# Patient Record
Sex: MALE | Race: WHITE | NOT HISPANIC OR LATINO | Employment: FULL TIME | URBAN - METROPOLITAN AREA
[De-identification: names, ages, dates, MRNs, and addresses within clinical notes are randomized per-mention and may not be internally consistent; named-entity substitution may affect disease eponyms.]

---

## 2019-07-23 ENCOUNTER — LAB REQUISITION (OUTPATIENT)
Dept: LAB | Facility: HOSPITAL | Age: 49
End: 2019-07-23

## 2019-07-23 ENCOUNTER — OFFICE VISIT (OUTPATIENT)
Dept: FAMILY MEDICINE CLINIC | Facility: CLINIC | Age: 49
End: 2019-07-23

## 2019-07-23 VITALS
BODY MASS INDEX: 27.86 KG/M2 | RESPIRATION RATE: 16 BRPM | HEART RATE: 71 BPM | SYSTOLIC BLOOD PRESSURE: 112 MMHG | DIASTOLIC BLOOD PRESSURE: 74 MMHG | HEIGHT: 71 IN | OXYGEN SATURATION: 99 % | TEMPERATURE: 96.9 F | WEIGHT: 199 LBS

## 2019-07-23 DIAGNOSIS — Z00.00 ROUTINE GENERAL MEDICAL EXAMINATION AT A HEALTH CARE FACILITY: ICD-10-CM

## 2019-07-23 DIAGNOSIS — Z12.5 PROSTATE CANCER SCREENING: ICD-10-CM

## 2019-07-23 DIAGNOSIS — Z00.00 ROUTINE GENERAL MEDICAL EXAMINATION AT A HEALTH CARE FACILITY: Primary | ICD-10-CM

## 2019-07-23 DIAGNOSIS — Z12.11 COLON CANCER SCREENING: ICD-10-CM

## 2019-07-23 PROCEDURE — 99386 PREV VISIT NEW AGE 40-64: CPT | Performed by: FAMILY MEDICINE

## 2019-07-23 PROCEDURE — 36415 COLL VENOUS BLD VENIPUNCTURE: CPT | Performed by: FAMILY MEDICINE

## 2019-07-23 RX ORDER — FAMOTIDINE 20 MG
TABLET ORAL
COMMUNITY

## 2019-07-23 RX ORDER — CETIRIZINE HYDROCHLORIDE 10 MG/1
10 TABLET ORAL DAILY
COMMUNITY

## 2019-07-23 RX ORDER — MULTIPLE VITAMINS W/ MINERALS TAB 9MG-400MCG
1 TAB ORAL DAILY
COMMUNITY

## 2019-07-23 RX ORDER — ASPIRIN 81 MG/1
81 TABLET, CHEWABLE ORAL DAILY
COMMUNITY

## 2019-07-23 RX ORDER — UBIDECARENONE 100 MG
100 CAPSULE ORAL DAILY
COMMUNITY

## 2019-07-23 NOTE — PROGRESS NOTES
Subjective   Sterling Campbell is a 49 y.o. male    Chief Complaint    Establish primary care  General physical exam  Prostate cancer screening    History of Present Illness  Patient presents today as a new patient.  He has requested a general physical exam with laboratory testing.  He has a history of previous elevated cholesterol levels but has been eating much more healthy since that was first discovered.  He has not had any blood levels rechecked as far as his cholesterol is concerned.  Health maintenance issues are reviewed.  Patient agreeable to have a colonoscopy scheduled.  He has never had colonoscopy.  No acute complaints.    The following portions of the patient's history were reviewed and updated as appropriate: allergies, current medications, past social history and problem list    Review of Systems   Constitutional: Negative.  Negative for chills, fatigue and fever.   HENT: Negative.    Eyes: Negative.    Respiratory: Negative.  Negative for cough, chest tightness, shortness of breath and wheezing.    Cardiovascular: Negative.  Negative for chest pain, palpitations and leg swelling.   Gastrointestinal: Negative.  Negative for abdominal pain, nausea and vomiting.   Endocrine: Negative.  Negative for polydipsia, polyphagia and polyuria.   Genitourinary: Negative.  Negative for dysuria, frequency and urgency.   Musculoskeletal: Negative.  Negative for arthralgias, back pain and myalgias.   Skin: Negative.  Negative for color change and rash.   Allergic/Immunologic: Negative.    Neurological: Negative.  Negative for weakness and headaches.   Hematological: Negative.  Negative for adenopathy. Does not bruise/bleed easily.   Psychiatric/Behavioral: Negative.    All other systems reviewed and are negative.      Objective     Vitals:    07/23/19 0810   BP: 112/74   Pulse: 71   Resp: 16   Temp: 96.9 °F (36.1 °C)   SpO2: 99%       Physical Exam   Constitutional: He is oriented to person, place, and time. He appears  well-developed and well-nourished.   HENT:   Head: Normocephalic and atraumatic.   Right Ear: External ear normal.   Left Ear: External ear normal.   Nose: Nose normal.   Mouth/Throat: Oropharynx is clear and moist.   Eyes: Conjunctivae and EOM are normal. Pupils are equal, round, and reactive to light.   Neck: Normal range of motion. Neck supple. No thyromegaly present.   Cardiovascular: Normal rate, regular rhythm, normal heart sounds and intact distal pulses.   No murmur heard.  Pulmonary/Chest: Effort normal and breath sounds normal.   Abdominal: Soft. Bowel sounds are normal. He exhibits no mass. There is no tenderness.   Genitourinary: Rectum normal, prostate normal and penis normal.   Musculoskeletal: Normal range of motion. He exhibits no edema.   Lymphadenopathy:     He has no cervical adenopathy.   Neurological: He is alert and oriented to person, place, and time. He has normal reflexes. No cranial nerve deficit.   Skin: Skin is warm and dry. No rash noted.   Psychiatric: He has a normal mood and affect. His behavior is normal.   Nursing note and vitals reviewed.      Assessment/Plan   Problem List Items Addressed This Visit     None      Visit Diagnoses     Routine general medical examination at a health care facility    -  Primary    Relevant Orders    CBC (No Diff)    Comprehensive Metabolic Panel    Lipid Panel    TSH    T4, Free    Prostate cancer screening        Relevant Orders    PSA Screen    Colon cancer screening        Relevant Orders    Ambulatory Referral For Screening Colonoscopy        Patient is counseled during today's visit regarding preventative health measures to include use of seatbelts, medication safety, healthy diet and regular exercise.

## 2019-07-24 LAB
ALBUMIN SERPL-MCNC: 5 G/DL (ref 3.5–5.2)
ALBUMIN/GLOB SERPL: 2.3 G/DL
ALP SERPL-CCNC: 58 U/L (ref 39–117)
ALT SERPL-CCNC: 33 U/L (ref 1–41)
AST SERPL-CCNC: 21 U/L (ref 1–40)
BILIRUB SERPL-MCNC: 0.6 MG/DL (ref 0.2–1.2)
BUN SERPL-MCNC: 13 MG/DL (ref 6–20)
BUN/CREAT SERPL: 13.5 (ref 7–25)
CALCIUM SERPL-MCNC: 9.1 MG/DL (ref 8.6–10.5)
CHLORIDE SERPL-SCNC: 103 MMOL/L (ref 98–107)
CHOLEST SERPL-MCNC: 275 MG/DL (ref 0–200)
CO2 SERPL-SCNC: 27.1 MMOL/L (ref 22–29)
CREAT SERPL-MCNC: 0.96 MG/DL (ref 0.76–1.27)
ERYTHROCYTE [DISTWIDTH] IN BLOOD BY AUTOMATED COUNT: 12.5 % (ref 12.3–15.4)
GLOBULIN SER CALC-MCNC: 2.2 GM/DL
GLUCOSE SERPL-MCNC: 112 MG/DL (ref 65–99)
HCT VFR BLD AUTO: 45.5 % (ref 37.5–51)
HDLC SERPL-MCNC: 49 MG/DL (ref 40–60)
HGB BLD-MCNC: 15 G/DL (ref 13–17.7)
LDLC SERPL CALC-MCNC: 161 MG/DL (ref 0–100)
MCH RBC QN AUTO: 30.9 PG (ref 26.6–33)
MCHC RBC AUTO-ENTMCNC: 33 G/DL (ref 31.5–35.7)
MCV RBC AUTO: 93.6 FL (ref 79–97)
PLATELET # BLD AUTO: 235 10*3/MM3 (ref 140–450)
POTASSIUM SERPL-SCNC: 4.6 MMOL/L (ref 3.5–5.2)
PROT SERPL-MCNC: 7.2 G/DL (ref 6–8.5)
PSA SERPL-MCNC: 0.47 NG/ML (ref 0–4)
RBC # BLD AUTO: 4.86 10*6/MM3 (ref 4.14–5.8)
SODIUM SERPL-SCNC: 142 MMOL/L (ref 136–145)
T4 FREE SERPL-MCNC: 1.48 NG/DL (ref 0.93–1.7)
TRIGL SERPL-MCNC: 323 MG/DL (ref 0–150)
TSH SERPL DL<=0.005 MIU/L-ACNC: 1.99 UIU/ML (ref 0.45–4.5)
VLDLC SERPL CALC-MCNC: 64.6 MG/DL
WBC # BLD AUTO: 3.72 10*3/MM3 (ref 3.4–10.8)

## 2019-07-24 RX ORDER — ROSUVASTATIN CALCIUM 20 MG/1
20 TABLET, COATED ORAL DAILY
Qty: 30 TABLET | Refills: 5 | Status: SHIPPED | OUTPATIENT
Start: 2019-07-24 | End: 2020-02-04 | Stop reason: SDUPTHER

## 2019-09-09 RX ORDER — SODIUM, POTASSIUM,MAG SULFATES 17.5-3.13G
2 SOLUTION, RECONSTITUTED, ORAL ORAL TAKE AS DIRECTED
Qty: 354 ML | Refills: 0 | Status: SHIPPED | OUTPATIENT
Start: 2019-09-09 | End: 2021-08-27

## 2019-09-13 ENCOUNTER — OUTSIDE FACILITY SERVICE (OUTPATIENT)
Dept: GASTROENTEROLOGY | Facility: CLINIC | Age: 49
End: 2019-09-13

## 2019-09-13 PROCEDURE — G0121 COLON CA SCRN NOT HI RSK IND: HCPCS | Performed by: INTERNAL MEDICINE

## 2020-02-04 ENCOUNTER — TELEPHONE (OUTPATIENT)
Dept: FAMILY MEDICINE CLINIC | Facility: CLINIC | Age: 50
End: 2020-02-04

## 2020-02-04 RX ORDER — ROSUVASTATIN CALCIUM 20 MG/1
20 TABLET, COATED ORAL DAILY
Qty: 30 TABLET | Refills: 6 | Status: SHIPPED | OUTPATIENT
Start: 2020-02-04 | End: 2020-03-08

## 2020-02-04 RX ORDER — ROSUVASTATIN CALCIUM 20 MG/1
20 TABLET, COATED ORAL DAILY
Qty: 30 TABLET | Refills: 6 | Status: SHIPPED | OUTPATIENT
Start: 2020-02-04 | End: 2020-02-04 | Stop reason: SDUPTHER

## 2020-02-04 NOTE — TELEPHONE ENCOUNTER
PATIENT CALLED FOR MED REFILL OF     rosuvastatin (CRESTOR) 20 MG tablet    HAS ABOUT 8 DAYS LEFT    [facility]  [address] [place] [address] [phone]    PATIENT CALL BACK NUMBER [phone]  PATIENT HAS APPOINTMENT 2/28/2020

## 2020-02-28 ENCOUNTER — LAB REQUISITION (OUTPATIENT)
Dept: LAB | Facility: HOSPITAL | Age: 50
End: 2020-02-28

## 2020-02-28 ENCOUNTER — OFFICE VISIT (OUTPATIENT)
Dept: FAMILY MEDICINE CLINIC | Facility: CLINIC | Age: 50
End: 2020-02-28

## 2020-02-28 VITALS
DIASTOLIC BLOOD PRESSURE: 64 MMHG | WEIGHT: 191 LBS | OXYGEN SATURATION: 98 % | HEART RATE: 69 BPM | TEMPERATURE: 98 F | SYSTOLIC BLOOD PRESSURE: 118 MMHG | BODY MASS INDEX: 26.49 KG/M2

## 2020-02-28 DIAGNOSIS — K40.90 LEFT INGUINAL HERNIA: ICD-10-CM

## 2020-02-28 DIAGNOSIS — E78.2 MIXED HYPERLIPIDEMIA: Primary | ICD-10-CM

## 2020-02-28 DIAGNOSIS — Z00.00 ROUTINE GENERAL MEDICAL EXAMINATION AT A HEALTH CARE FACILITY: ICD-10-CM

## 2020-02-28 PROCEDURE — 36415 COLL VENOUS BLD VENIPUNCTURE: CPT | Performed by: FAMILY MEDICINE

## 2020-02-28 PROCEDURE — 99213 OFFICE O/P EST LOW 20 MIN: CPT | Performed by: FAMILY MEDICINE

## 2020-02-28 NOTE — PROGRESS NOTES
Subjective   Sterling Campbell is a 49 y.o. male    Chief Complaint    High cholesterol  Left groin pain    History of Present Illness  Patient with history of hyperlipidemia.  He is on rosuvastatin 20 mg daily but has changed his diet significantly going to a plant-based diet although he reports that occasionally he will eat some meat.  He was at a health fair and had his cholesterol checked and it was down to 129.  He would like to get that rechecked today because he would like to get off of the rosuvastatin if possible.  He is fasting for lab studies.  Patient is also developed pain and slight swelling in the left groin area.  He was working out at the gym doing some weight lifting when he developed some soreness and subsequent swelling in the left inguinal area.  He is concerned about a possible hernia.  He has never had an inguinal hernia.    The following portions of the patient's history were reviewed and updated as appropriate: allergies, current medications, past social history and problem list    Review of Systems   Constitutional: Negative for chills, fatigue and fever.   Respiratory: Negative for cough, chest tightness, shortness of breath and wheezing.    Cardiovascular: Negative for chest pain, palpitations and leg swelling.   Gastrointestinal: Negative for abdominal pain, nausea and vomiting.   Endocrine: Negative for polydipsia, polyphagia and polyuria.   Genitourinary: Negative for dysuria, frequency and urgency.   Musculoskeletal: Negative for arthralgias, back pain and myalgias.   Skin: Negative for color change and rash.   Neurological: Negative for weakness and headaches.   Hematological: Negative for adenopathy. Does not bruise/bleed easily.       Objective     Vitals:    02/28/20 0817   BP: 118/64   Pulse: 69   Temp: 98 °F (36.7 °C)   SpO2: 98%       Physical Exam   Constitutional: He is oriented to person, place, and time. He appears well-developed and well-nourished.   HENT:   Head: Normocephalic.    Mouth/Throat: Oropharynx is clear and moist.   Eyes: Pupils are equal, round, and reactive to light. Conjunctivae are normal.   Neck: Neck supple. No thyromegaly present.   Cardiovascular: Normal rate and regular rhythm.   Pulmonary/Chest: Effort normal.   Abdominal: Soft. Bowel sounds are normal. There is no tenderness. A hernia is present. Hernia confirmed positive in the left inguinal area.   Lymphadenopathy:     He has no cervical adenopathy.   Neurological: He is alert and oriented to person, place, and time.   Skin: Skin is warm and dry. No rash noted.   Psychiatric: He has a normal mood and affect. His behavior is normal.   Nursing note and vitals reviewed.      Assessment/Plan   Problem List Items Addressed This Visit     None      Visit Diagnoses     Mixed hyperlipidemia    -  Primary    Relevant Orders    Comprehensive Metabolic Panel    Lipid Panel    Left inguinal hernia        Relevant Orders    Ambulatory Referral to General Surgery (Completed)

## 2020-02-29 LAB
ALBUMIN SERPL-MCNC: 4.8 G/DL (ref 4–5)
ALBUMIN/GLOB SERPL: 1.7 {RATIO} (ref 1.2–2.2)
ALP SERPL-CCNC: 57 IU/L (ref 39–117)
ALT SERPL-CCNC: 37 IU/L (ref 0–44)
AST SERPL-CCNC: 23 IU/L (ref 0–40)
BILIRUB SERPL-MCNC: 0.7 MG/DL (ref 0–1.2)
BUN SERPL-MCNC: 11 MG/DL (ref 6–24)
BUN/CREAT SERPL: 12 (ref 9–20)
CALCIUM SERPL-MCNC: 9.7 MG/DL (ref 8.7–10.2)
CHLORIDE SERPL-SCNC: 102 MMOL/L (ref 96–106)
CHOLEST SERPL-MCNC: 165 MG/DL (ref 100–199)
CO2 SERPL-SCNC: 25 MMOL/L (ref 20–29)
CREAT SERPL-MCNC: 0.89 MG/DL (ref 0.76–1.27)
GLOBULIN SER CALC-MCNC: 2.9 G/DL (ref 1.5–4.5)
GLUCOSE SERPL-MCNC: 102 MG/DL (ref 65–99)
HDLC SERPL-MCNC: 46 MG/DL
LDLC SERPL CALC-MCNC: 79 MG/DL (ref 0–99)
POTASSIUM SERPL-SCNC: 4.9 MMOL/L (ref 3.5–5.2)
PROT SERPL-MCNC: 7.7 G/DL (ref 6–8.5)
SODIUM SERPL-SCNC: 142 MMOL/L (ref 134–144)
TRIGL SERPL-MCNC: 201 MG/DL (ref 0–149)
VLDLC SERPL CALC-MCNC: 40 MG/DL (ref 5–40)

## 2020-03-06 ENCOUNTER — TELEPHONE (OUTPATIENT)
Dept: FAMILY MEDICINE CLINIC | Facility: CLINIC | Age: 50
End: 2020-03-06

## 2020-03-06 NOTE — TELEPHONE ENCOUNTER
PT CALLED REQUEST FOR RESULTS FOR cholesterol ALSO REGARDING REFERRAL FOR HERNIA.    PLEASE ADVISE.  CALL BACK: [phone]

## 2020-03-06 NOTE — TELEPHONE ENCOUNTER
Referral order has been entered. we do not make the appointment from this office.  The surgeon's office will call to set up the appointment

## 2020-03-08 RX ORDER — ROSUVASTATIN CALCIUM 10 MG/1
10 TABLET, COATED ORAL DAILY
Qty: 30 TABLET | Refills: 5 | Status: SHIPPED | OUTPATIENT
Start: 2020-03-08 | End: 2020-03-09 | Stop reason: SDUPTHER

## 2020-03-09 NOTE — TELEPHONE ENCOUNTER
Pt called in requesting med refill rosuvastatin (CRESTOR) 10 MG tablet to be sent to [facility] - [place], [place] - [address] [facility] [phone] [facility] [phone]     Pt also request the lab results to be sent by mail

## 2020-03-10 RX ORDER — ROSUVASTATIN CALCIUM 10 MG/1
10 TABLET, COATED ORAL DAILY
Qty: 30 TABLET | Refills: 5 | Status: SHIPPED | OUTPATIENT
Start: 2020-03-10 | End: 2020-10-21

## 2020-06-23 ENCOUNTER — APPOINTMENT (OUTPATIENT)
Dept: PREADMISSION TESTING | Facility: HOSPITAL | Age: 50
End: 2020-06-23

## 2020-06-23 LAB
HCT VFR BLD AUTO: 40.8 % (ref 37.5–51)
REF LAB TEST METHOD: NORMAL
SARS-COV-2 RNA RESP QL NAA+PROBE: NOT DETECTED

## 2020-06-23 PROCEDURE — U0002 COVID-19 LAB TEST NON-CDC: HCPCS

## 2020-06-23 PROCEDURE — 36415 COLL VENOUS BLD VENIPUNCTURE: CPT

## 2020-06-23 PROCEDURE — U0004 COV-19 TEST NON-CDC HGH THRU: HCPCS

## 2020-06-23 PROCEDURE — C9803 HOPD COVID-19 SPEC COLLECT: HCPCS

## 2020-06-23 PROCEDURE — 85014 HEMATOCRIT: CPT | Performed by: SURGERY

## 2020-10-21 RX ORDER — ROSUVASTATIN CALCIUM 10 MG/1
10 TABLET, COATED ORAL DAILY
Qty: 30 TABLET | Refills: 5 | Status: SHIPPED | OUTPATIENT
Start: 2020-10-21 | End: 2021-05-04 | Stop reason: SDUPTHER

## 2020-10-21 NOTE — TELEPHONE ENCOUNTER
Caller: [facility] - [place] [address] [phone] [facility] [phone] FX    Relationship: Pharmacy    Best call back number: [phone]    Medication needed:   Requested Prescriptions     Pending Prescriptions Disp Refills   • rosuvastatin (CRESTOR) 10 MG tablet [Pharmacy Med Name: ROSUVASTATIN CALCIUM 10MG* 10 Tablet] 30 tablet 5     Sig: TAKE 1 TABLET BY MOUTH DAILY.       When do you need the refill by: ASAP    What details did the patient provide when requesting the medication: OUT OF MEDICATION    Does the patient have less than a 3 day supply:  [x] Yes  [] No    What is the patient's preferred pharmacy: [facility] - [place] [phone] [facility] [phone] FX

## 2021-05-04 RX ORDER — ROSUVASTATIN CALCIUM 10 MG/1
10 TABLET, COATED ORAL DAILY
Qty: 30 TABLET | Refills: 5 | OUTPATIENT
Start: 2021-05-04

## 2021-05-04 RX ORDER — ROSUVASTATIN CALCIUM 10 MG/1
10 TABLET, COATED ORAL DAILY
Qty: 30 TABLET | Refills: 1 | Status: SHIPPED | OUTPATIENT
Start: 2021-05-04 | End: 2021-05-07 | Stop reason: SDUPTHER

## 2021-05-04 NOTE — TELEPHONE ENCOUNTER
Pharmacy stated pt needs a refill of rosuvastatin 10 mg. Pt hasn't been in the office since 2/28/2020 , pt needs an OV before a refill can be sent in to pharmacy. Lvm for pt call office back.

## 2021-05-07 RX ORDER — ROSUVASTATIN CALCIUM 10 MG/1
10 TABLET, COATED ORAL DAILY
Qty: 30 TABLET | Refills: 1 | Status: SHIPPED | OUTPATIENT
Start: 2021-05-07 | End: 2021-08-27 | Stop reason: SDUPTHER

## 2021-05-07 NOTE — TELEPHONE ENCOUNTER
PHARMACY CALLED REGARDING REFILL FOR PATIENT, rosuvastatin (CRESTOR) 10 MG tablet    PHARMACY STATED PATIENT IS OUT OF HIS MEDICATION.      PATIENT IS SCHEDULED WITH DOCTOR YANG MARTINEZ 6/11 @ 8:30A.

## 2021-05-13 ENCOUNTER — TELEPHONE (OUTPATIENT)
Dept: FAMILY MEDICINE CLINIC | Facility: CLINIC | Age: 51
End: 2021-05-13

## 2021-05-13 NOTE — TELEPHONE ENCOUNTER
Caller: Sterling Campbell    Relationship to patient: Self    Best call back number: [phone]    Chief complaint: N/A    Type of visit: PHYSICAL     Requested date: REQUESTED A FRIDAY     If rescheduling, when is the original appointment: N/A    Additional notes:PATIENT STATES THAT HE CAN ONLY DO Friday APPOINTMENTS AND WOULD LIKE A Friday AT 8 AM INSTEAD OF WHAT WAS AVAILABLE AT 9 AM.

## 2021-08-27 ENCOUNTER — OFFICE VISIT (OUTPATIENT)
Dept: FAMILY MEDICINE CLINIC | Facility: CLINIC | Age: 51
End: 2021-08-27

## 2021-08-27 VITALS
HEART RATE: 74 BPM | RESPIRATION RATE: 16 BRPM | HEIGHT: 71 IN | SYSTOLIC BLOOD PRESSURE: 118 MMHG | DIASTOLIC BLOOD PRESSURE: 74 MMHG | WEIGHT: 199.8 LBS | BODY MASS INDEX: 27.97 KG/M2 | TEMPERATURE: 97.3 F | OXYGEN SATURATION: 98 %

## 2021-08-27 DIAGNOSIS — E78.2 MIXED HYPERLIPIDEMIA: ICD-10-CM

## 2021-08-27 DIAGNOSIS — Z12.5 PROSTATE CANCER SCREENING: ICD-10-CM

## 2021-08-27 DIAGNOSIS — Z00.00 ANNUAL PHYSICAL EXAM: Primary | ICD-10-CM

## 2021-08-27 PROCEDURE — 99396 PREV VISIT EST AGE 40-64: CPT | Performed by: FAMILY MEDICINE

## 2021-08-27 RX ORDER — ROSUVASTATIN CALCIUM 10 MG/1
10 TABLET, COATED ORAL DAILY
Qty: 90 TABLET | Refills: 3 | Status: SHIPPED | OUTPATIENT
Start: 2021-08-27 | End: 2022-08-31

## 2021-08-27 NOTE — PROGRESS NOTES
"Subjective   Sterling Campbell is a 51 y.o. male    Chief Complaint    Annual physical   Hyperlipidemia   Allergic rhinitis     History of Present Illness  The patient returns today after almost a 2-year absence with his last physical being in 02/2020. He had an inguinal herniorrhaphy last summer 2020. The patient said his hernia never bothers him, but he knows something is there that is a \"kind of numb feeling.\"     He reports he has been on a plant-based diet since 01/2020. The patient states that he has been eating healthy. He expect his cholesterol to have improved.     The patient had a colonoscopy in 2019 and was told to come back in 10 years.     He said when he drives to work, he has to breathe deeply. The patient is unsure if it is anxiety or stress. He states that he does not have it all the time, but he notices it when he goes to work but not while on vacation. The patient is wondering if it is more of a mental issue. He has been trying to run and gets up to 3 miles. The patient said that a month or two ago, he was sucking up air and a little bit of blood came out. He states that someone told him he was not breathing good and that it was from his cough. The patient had a sharp pain for approximately 2 seconds and it was in his lungs, but that is the only time he has had it.    He had COVID in 01/2021 and had 3 days with very little symptoms. The patient was tested for COVID on 08/25/2021, and it was negative. He has never contracted influenza and thinks his autoimmune system is good. The patient asked if he needs to get the COVID vaccine.     The following portions of the patient's history were reviewed and updated as appropriate: allergies, current medications, past social history and problem list    Review of Systems   Constitutional: Negative.  Negative for chills, fatigue and fever.   HENT: Negative.    Eyes: Negative.    Respiratory: Negative.  Negative for cough, chest tightness, shortness of " breath and wheezing.    Cardiovascular: Negative.  Negative for chest pain, palpitations and leg swelling.   Gastrointestinal: Negative.  Negative for abdominal pain, nausea and vomiting.   Endocrine: Negative.  Negative for polydipsia, polyphagia and polyuria.   Genitourinary: Negative.  Negative for dysuria, frequency and urgency.   Musculoskeletal: Negative.  Negative for arthralgias, back pain and myalgias.   Skin: Negative.  Negative for color change and rash.   Allergic/Immunologic: Negative.    Neurological: Negative.  Negative for weakness and headaches.   Hematological: Negative.  Negative for adenopathy. Does not bruise/bleed easily.   Psychiatric/Behavioral: Negative.    All other systems reviewed and are negative.      Objective     Vitals:    08/27/21 0857   BP: 118/74   Pulse: 74   Resp: 16   Temp: 97.3 °F (36.3 °C)   SpO2: 98%       Physical Exam  Vitals and nursing note reviewed.   Constitutional:       Appearance: He is well-developed.   HENT:      Head: Normocephalic and atraumatic.      Right Ear: External ear normal.      Left Ear: External ear normal.      Nose: Nose normal.   Eyes:      Conjunctiva/sclera: Conjunctivae normal.      Pupils: Pupils are equal, round, and reactive to light.   Neck:      Thyroid: No thyromegaly.   Cardiovascular:      Rate and Rhythm: Normal rate and regular rhythm.      Heart sounds: Normal heart sounds. No murmur heard.     Pulmonary:      Effort: Pulmonary effort is normal.      Breath sounds: Normal breath sounds.   Abdominal:      General: Bowel sounds are normal.      Palpations: Abdomen is soft. There is no mass.      Tenderness: There is no abdominal tenderness.   Genitourinary:     Penis: Normal.       Prostate: Normal.      Rectum: Normal.   Musculoskeletal:         General: Normal range of motion.      Cervical back: Normal range of motion and neck supple.   Lymphadenopathy:      Cervical: No cervical adenopathy.   Skin:     General: Skin is warm and dry.       Findings: No rash.   Neurological:      Mental Status: He is alert and oriented to person, place, and time.      Cranial Nerves: No cranial nerve deficit.      Deep Tendon Reflexes: Reflexes are normal and symmetric.   Psychiatric:         Behavior: Behavior normal.         Assessment/Plan   Problems Addressed this Visit     None      Visit Diagnoses     Annual physical exam    -  Primary    Relevant Orders    CBC (No Diff)    Comprehensive Metabolic Panel    Lipid Panel    TSH    T4, Free    Mixed hyperlipidemia        Relevant Medications    rosuvastatin (CRESTOR) 10 MG tablet    Other Relevant Orders    Comprehensive Metabolic Panel    Lipid Panel    Prostate cancer screening        Relevant Orders    PSA Screen      Diagnoses       Codes Comments    Annual physical exam    -  Primary ICD-10-CM: Z00.00  ICD-9-CM: V70.0     Mixed hyperlipidemia     ICD-10-CM: E78.2  ICD-9-CM: 272.2     Prostate cancer screening     ICD-10-CM: Z12.5  ICD-9-CM: V76.44         Preventive medicine discussed, diet, exercise, healthy living discussed at length.  Discussed nutrition, physical activity, healthy weight, injury prevention,  use of tobacco, alcohol and drugs, dental health, mental health, immunizations, screening.    Strongly recommend Covid vaccine.    Please note that portions of this document were completed with a voice recognition program. Efforts were made to edit the dictations, but occasionally words are mis-transcribed       Transcribed from ambient dictation for CEM Cook MD by Thais Hermosillo.  08/27/21   10:13 EDT    I have personally performed the services described in this document as transcribed by the above individual, and it is both accurate and complete.  CEM Cook MD  8/27/2021  12:52 EDT

## 2021-08-28 LAB
ALBUMIN SERPL-MCNC: 5 G/DL (ref 3.5–5.2)
ALBUMIN/GLOB SERPL: 2.2 G/DL
ALP SERPL-CCNC: 63 U/L (ref 39–117)
ALT SERPL-CCNC: 46 U/L (ref 1–41)
AST SERPL-CCNC: 29 U/L (ref 1–40)
BILIRUB SERPL-MCNC: 0.5 MG/DL (ref 0–1.2)
BUN SERPL-MCNC: 13 MG/DL (ref 6–20)
BUN/CREAT SERPL: 16.9 (ref 7–25)
CALCIUM SERPL-MCNC: 10.2 MG/DL (ref 8.6–10.5)
CHLORIDE SERPL-SCNC: 102 MMOL/L (ref 98–107)
CHOLEST SERPL-MCNC: 210 MG/DL (ref 0–200)
CO2 SERPL-SCNC: 27.9 MMOL/L (ref 22–29)
CREAT SERPL-MCNC: 0.77 MG/DL (ref 0.76–1.27)
ERYTHROCYTE [DISTWIDTH] IN BLOOD BY AUTOMATED COUNT: 12.6 % (ref 12.3–15.4)
GLOBULIN SER CALC-MCNC: 2.3 GM/DL
GLUCOSE SERPL-MCNC: 89 MG/DL (ref 65–99)
HCT VFR BLD AUTO: 45.4 % (ref 37.5–51)
HDLC SERPL-MCNC: 48 MG/DL (ref 40–60)
HGB BLD-MCNC: 15.3 G/DL (ref 13–17.7)
LDLC SERPL CALC-MCNC: 118 MG/DL (ref 0–100)
MCH RBC QN AUTO: 30.9 PG (ref 26.6–33)
MCHC RBC AUTO-ENTMCNC: 33.7 G/DL (ref 31.5–35.7)
MCV RBC AUTO: 91.7 FL (ref 79–97)
PLATELET # BLD AUTO: 260 10*3/MM3 (ref 140–450)
POTASSIUM SERPL-SCNC: 5.1 MMOL/L (ref 3.5–5.2)
PROT SERPL-MCNC: 7.3 G/DL (ref 6–8.5)
PSA SERPL-MCNC: 0.46 NG/ML (ref 0–4)
RBC # BLD AUTO: 4.95 10*6/MM3 (ref 4.14–5.8)
SODIUM SERPL-SCNC: 140 MMOL/L (ref 136–145)
T4 FREE SERPL-MCNC: 1.44 NG/DL (ref 0.93–1.7)
TRIGL SERPL-MCNC: 252 MG/DL (ref 0–150)
TSH SERPL DL<=0.005 MIU/L-ACNC: 1.78 UIU/ML (ref 0.27–4.2)
VLDLC SERPL CALC-MCNC: 44 MG/DL (ref 5–40)
WBC # BLD AUTO: 4.23 10*3/MM3 (ref 3.4–10.8)

## 2022-08-31 RX ORDER — ROSUVASTATIN CALCIUM 10 MG/1
TABLET, COATED ORAL
Qty: 30 TABLET | Refills: 1 | Status: SHIPPED | OUTPATIENT
Start: 2022-08-31 | End: 2023-03-27

## 2022-08-31 RX ORDER — ROSUVASTATIN CALCIUM 10 MG/1
TABLET, COATED ORAL
Qty: 30 TABLET | Refills: 3 | Status: SHIPPED | OUTPATIENT
Start: 2022-08-31

## 2023-03-20 RX ORDER — ROSUVASTATIN CALCIUM 10 MG/1
TABLET, COATED ORAL
Qty: 30 TABLET | Refills: 1 | OUTPATIENT
Start: 2023-03-20

## 2023-03-20 NOTE — TELEPHONE ENCOUNTER
Pt has not been seen since 2021.  Pt to make appointment to ensure this is still a good therapy for him.  rx not sent in.

## 2023-03-22 ENCOUNTER — TELEPHONE (OUTPATIENT)
Dept: FAMILY MEDICINE CLINIC | Facility: CLINIC | Age: 53
End: 2023-03-22

## 2023-03-22 RX ORDER — ROSUVASTATIN CALCIUM 10 MG/1
10 TABLET, COATED ORAL DAILY
Qty: 30 TABLET | Refills: 3 | Status: CANCELLED | OUTPATIENT
Start: 2023-03-22

## 2023-03-22 NOTE — TELEPHONE ENCOUNTER
Caller: Sterling Campbell    Relationship: Self    Best call back number: [phone]  Requested Prescriptions:   Requested Prescriptions     Pending Prescriptions Disp Refills   • rosuvastatin (CRESTOR) 10 MG tablet 30 tablet 3     Sig: Take 1 tablet by mouth Daily.        Pharmacy where request should be sent:      [facility] - [place], [place] - [address] [phone] [facility] [phone] FX            Last office visit with prescribing clinician: 8/27/2021   Last telemedicine visit with prescribing clinician: Visit date not found   Next office visit with prescribing clinician: Visit date not found     Additional details provided by patient:     Does the patient have less than a 3 day supply:  [x] Yes  [] No    Would you like a call back once the refill request has been completed: [x] Yes [] No    If the office needs to give you a call back, can they leave a voicemail: [x] Yes [] No    Akshat Sheridan Rep   03/22/23 15:02 EDT

## 2023-03-22 NOTE — TELEPHONE ENCOUNTER
Caller: Sterling Campbell    Relationship to patient: Self    Best call back number: [phone]    NEEDS PHYSICAL WITH DR FAY ONLY ON Tuesday OR FRIDAYS 9 AM

## 2023-03-27 RX ORDER — ROSUVASTATIN CALCIUM 10 MG/1
TABLET, COATED ORAL
Qty: 30 TABLET | Refills: 1 | Status: SHIPPED | OUTPATIENT
Start: 2023-03-27

## 2023-04-05 ENCOUNTER — TRANSCRIBE ORDERS (OUTPATIENT)
Dept: PHYSICAL THERAPY | Facility: CLINIC | Age: 53
End: 2023-04-05
Payer: COMMERCIAL

## 2023-04-05 DIAGNOSIS — M17.12 UNILATERAL PRIMARY OSTEOARTHRITIS, LEFT KNEE: Primary | ICD-10-CM

## 2023-04-14 ENCOUNTER — LAB (OUTPATIENT)
Dept: FAMILY MEDICINE CLINIC | Facility: CLINIC | Age: 53
End: 2023-04-14
Payer: COMMERCIAL

## 2023-04-14 ENCOUNTER — OFFICE VISIT (OUTPATIENT)
Dept: FAMILY MEDICINE CLINIC | Facility: CLINIC | Age: 53
End: 2023-04-14
Payer: COMMERCIAL

## 2023-04-14 VITALS
SYSTOLIC BLOOD PRESSURE: 120 MMHG | WEIGHT: 198 LBS | DIASTOLIC BLOOD PRESSURE: 78 MMHG | RESPIRATION RATE: 16 BRPM | TEMPERATURE: 96.5 F | HEART RATE: 80 BPM | BODY MASS INDEX: 27.72 KG/M2 | OXYGEN SATURATION: 99 % | HEIGHT: 71 IN

## 2023-04-14 DIAGNOSIS — Z11.59 NEED FOR HEPATITIS C SCREENING TEST: ICD-10-CM

## 2023-04-14 DIAGNOSIS — Z12.5 PROSTATE CANCER SCREENING: ICD-10-CM

## 2023-04-14 DIAGNOSIS — E78.2 MIXED HYPERLIPIDEMIA: ICD-10-CM

## 2023-04-14 DIAGNOSIS — Z00.00 GENERAL MEDICAL EXAM: ICD-10-CM

## 2023-04-14 DIAGNOSIS — Z00.00 ANNUAL PHYSICAL EXAM: Primary | ICD-10-CM

## 2023-04-14 PROBLEM — R25.2 SPASM: Status: ACTIVE | Noted: 2017-08-15

## 2023-04-14 PROBLEM — M54.2 PAIN OF CERVICAL FACET JOINT: Status: ACTIVE | Noted: 2017-08-15

## 2023-04-14 PROBLEM — R29.3 ABNORMAL POSTURE: Status: ACTIVE | Noted: 2017-08-15

## 2023-04-14 PROBLEM — R27.8 MUSCULAR INCOORDINATION: Status: ACTIVE | Noted: 2017-08-15

## 2023-04-14 PROBLEM — M62.81 MUSCLE WEAKNESS: Status: ACTIVE | Noted: 2017-08-15

## 2023-04-14 PROCEDURE — 99396 PREV VISIT EST AGE 40-64: CPT | Performed by: FAMILY MEDICINE

## 2023-04-14 RX ORDER — ROSUVASTATIN CALCIUM 10 MG/1
10 TABLET, COATED ORAL DAILY
Qty: 90 TABLET | Refills: 3 | Status: SHIPPED | OUTPATIENT
Start: 2023-04-14

## 2023-04-14 NOTE — PROGRESS NOTES
Subjective   Sterling Campbell is a 52 y.o. male    Chief Complaint    Hyperlipidemia    History of Present Illness  The patient returns for a follow-up visit. He is past due for a checkup and laboratory work. He is fasting today, so we can get his laboratory work.    The patient weighs 198 pounds today. He has modified his diet in a healthier way. He is still working full-time. He believes that he is healthy holistically. He is taking Crestor and is requesting refills. He had a colposcopy done in 2019 and was told to have a repeat in about 10 years. He is due for a hepatitis C screening test. His blood pressure today is 120/78 mmHg.     The patient experienced buckling in his left knee on 12/2022. He had magnetic resonance imaging done approximately 3 weeks ago that was ordered by Dr. Emery Sy, which revealed osteoarthritis of the left knee joint. He denies having tenderness. He had 1 session of physical therapy and was taught strengthening exercises. He no longer plays basketball.     The patient has been experiencing pruritus ani intermittently and notes that he can not control himself from scratching the area. He uses wet wipes.     The patient inquires about when he is having a cardiac stress test. His father had his initial episode of myocardial infarction at age 55. He is actively doing physical exercises.     The patient inquires about polysomnography. He snores more when he consumes alcohol. He does snore occasionally. He denies having any changes in his daytime energy. He denies experiencing fatigue.     The following portions of the patient's history were reviewed and updated as appropriate: allergies, current medications, past social history and problem list    Review of Systems   Constitutional: Negative for chills, diaphoresis, fatigue, fever and unexpected weight change.   HENT: Negative for hearing loss, nosebleeds, sore throat and tinnitus.    Eyes: Negative for pain and visual disturbance.    Respiratory: Negative for cough, chest tightness, shortness of breath and wheezing.    Cardiovascular: Negative for chest pain, palpitations and leg swelling.   Gastrointestinal: Negative for abdominal pain, diarrhea, nausea and vomiting.   Endocrine: Negative for cold intolerance, heat intolerance, polydipsia, polyphagia and polyuria.   Genitourinary: Negative for difficulty urinating, dysuria, frequency, hematuria and urgency.   Musculoskeletal: Negative for arthralgias, back pain, joint swelling and myalgias.   Skin: Negative for color change, rash and wound.   Allergic/Immunologic: Negative for environmental allergies.   Neurological: Negative for dizziness, syncope, weakness, numbness and headaches.   Hematological: Negative for adenopathy. Does not bruise/bleed easily.   Psychiatric/Behavioral: Negative for dysphoric mood and sleep disturbance. The patient is not nervous/anxious.        Objective     Vitals:    04/14/23 0828   BP: 120/78   Pulse: 80   Resp: 16   Temp: 96.5 °F (35.8 °C)   SpO2: 99%       Physical Exam  Vitals and nursing note reviewed.   Constitutional:       General: He is not in acute distress.     Appearance: Normal appearance. He is well-developed. He is not ill-appearing, toxic-appearing or diaphoretic.   HENT:      Head: Normocephalic and atraumatic.      Right Ear: External ear normal.      Left Ear: External ear normal.   Eyes:      Conjunctiva/sclera: Conjunctivae normal.      Pupils: Pupils are equal, round, and reactive to light.   Neck:      Thyroid: No thyromegaly.      Vascular: No carotid bruit.   Cardiovascular:      Rate and Rhythm: Normal rate and regular rhythm.      Pulses: Normal pulses.      Heart sounds: Normal heart sounds. No murmur heard.  Pulmonary:      Effort: Pulmonary effort is normal. No respiratory distress.      Breath sounds: Normal breath sounds.   Abdominal:      General: Bowel sounds are normal.      Palpations: Abdomen is soft. There is no mass.       Tenderness: There is no abdominal tenderness.   Musculoskeletal:         General: No swelling. Normal range of motion.      Cervical back: Normal range of motion and neck supple.   Lymphadenopathy:      Cervical: No cervical adenopathy.   Skin:     General: Skin is warm and dry.      Findings: No lesion or rash.   Neurological:      Mental Status: He is alert and oriented to person, place, and time.      Cranial Nerves: No cranial nerve deficit.      Sensory: No sensory deficit.      Motor: No weakness.      Coordination: Coordination normal.      Gait: Gait normal.      Deep Tendon Reflexes: Reflexes are normal and symmetric.   Psychiatric:         Mood and Affect: Mood normal.         Behavior: Behavior normal.         Thought Content: Thought content normal.         Judgment: Judgment normal.         Assessment & Plan     Problems Addressed this Visit    None  Visit Diagnoses     Annual physical exam    -  Primary    Relevant Orders    CBC (No Diff)    Comprehensive Metabolic Panel    Lipid Panel    TSH    T4, Free    Mixed hyperlipidemia        Relevant Medications    rosuvastatin (CRESTOR) 10 MG tablet    Other Relevant Orders    Comprehensive Metabolic Panel    Lipid Panel    Prostate cancer screening        Relevant Orders    PSA Screen    Need for hepatitis C screening test        Relevant Orders    Hepatitis C Antibody      Diagnoses       Codes Comments    Annual physical exam    -  Primary ICD-10-CM: Z00.00  ICD-9-CM: V70.0     Mixed hyperlipidemia     ICD-10-CM: E78.2  ICD-9-CM: 272.2     Prostate cancer screening     ICD-10-CM: Z12.5  ICD-9-CM: V76.44     Need for hepatitis C screening test     ICD-10-CM: Z11.59  ICD-9-CM: V73.89         Preventive medicine discussed, diet, exercise, healthy living discussed at length.  Discussed nutrition, physical activity, healthy weight, injury prevention, misuse of tobacco, alcohol and drugs, dental health, mental health, immunizations, screening    Part of this  note may be an electronic transcription/translation of spoken language to printed text using the Dragon Dictation System.           Transcribed from ambient dictation for CEM Cook MD by Kota Russ.  04/14/23   10:33 EDT  Patient or patient representative verbalized consent to the visit recording.  I have personally performed the services described in this document as transcribed by the above individual, and it is both accurate and complete.

## 2023-04-15 LAB
ALBUMIN SERPL-MCNC: 4.7 G/DL (ref 3.8–4.9)
ALBUMIN/GLOB SERPL: 1.9 {RATIO} (ref 1.2–2.2)
ALP SERPL-CCNC: 51 IU/L (ref 44–121)
ALT SERPL-CCNC: 29 IU/L (ref 0–44)
AST SERPL-CCNC: 20 IU/L (ref 0–40)
BILIRUB SERPL-MCNC: 0.3 MG/DL (ref 0–1.2)
BUN SERPL-MCNC: 14 MG/DL (ref 6–24)
BUN/CREAT SERPL: 18 (ref 9–20)
CALCIUM SERPL-MCNC: 9.3 MG/DL (ref 8.7–10.2)
CHLORIDE SERPL-SCNC: 105 MMOL/L (ref 96–106)
CHOLEST SERPL-MCNC: 190 MG/DL (ref 100–199)
CO2 SERPL-SCNC: 18 MMOL/L (ref 20–29)
CREAT SERPL-MCNC: 0.79 MG/DL (ref 0.76–1.27)
EGFRCR SERPLBLD CKD-EPI 2021: 106 ML/MIN/1.73
ERYTHROCYTE [DISTWIDTH] IN BLOOD BY AUTOMATED COUNT: 12.4 % (ref 11.6–15.4)
GLOBULIN SER CALC-MCNC: 2.5 G/DL (ref 1.5–4.5)
GLUCOSE SERPL-MCNC: ABNORMAL MG/DL
HCT VFR BLD AUTO: 42.9 % (ref 37.5–51)
HCV IGG SERPL QL IA: NON REACTIVE
HDLC SERPL-MCNC: 54 MG/DL
HGB BLD-MCNC: 14.7 G/DL (ref 13–17.7)
LDLC SERPL CALC-MCNC: 103 MG/DL (ref 0–99)
MCH RBC QN AUTO: 30.4 PG (ref 26.6–33)
MCHC RBC AUTO-ENTMCNC: 34.3 G/DL (ref 31.5–35.7)
MCV RBC AUTO: 89 FL (ref 79–97)
PLATELET # BLD AUTO: 241 X10E3/UL (ref 150–450)
POTASSIUM SERPL-SCNC: ABNORMAL MMOL/L
PROT SERPL-MCNC: 7.2 G/DL (ref 6–8.5)
PSA SERPL-MCNC: 0.4 NG/ML (ref 0–4)
RBC # BLD AUTO: 4.84 X10E6/UL (ref 4.14–5.8)
SODIUM SERPL-SCNC: 141 MMOL/L (ref 134–144)
T4 FREE SERPL-MCNC: 1.5 NG/DL (ref 0.82–1.77)
TRIGL SERPL-MCNC: 191 MG/DL (ref 0–149)
TSH SERPL DL<=0.005 MIU/L-ACNC: 1.95 UIU/ML (ref 0.45–4.5)
VLDLC SERPL CALC-MCNC: 33 MG/DL (ref 5–40)
WBC # BLD AUTO: 4.2 X10E3/UL (ref 3.4–10.8)

## 2023-05-02 ENCOUNTER — TREATMENT (OUTPATIENT)
Dept: PHYSICAL THERAPY | Facility: CLINIC | Age: 53
End: 2023-05-02
Payer: COMMERCIAL

## 2023-05-02 DIAGNOSIS — G89.29 CHRONIC PAIN OF LEFT KNEE: ICD-10-CM

## 2023-05-02 DIAGNOSIS — M17.12 ARTHRITIS OF LEFT KNEE: Primary | ICD-10-CM

## 2023-05-02 DIAGNOSIS — M25.562 CHRONIC PAIN OF LEFT KNEE: ICD-10-CM

## 2023-05-02 DIAGNOSIS — R53.1 WEAKNESS: ICD-10-CM

## 2023-05-03 NOTE — PROGRESS NOTES
Physical Therapy Initial Evaluation and Plan of Care    Patient: Sterling Campbell   : 1970  Diagnosis/ICD-10 Code:  Arthritis of left knee [M17.12]  Referring practitioner: Emery Sy MD  Date of Initial Visit: 2023  Today's Date: 5/3/2023  Patient seen for 1 session         Visit Diagnoses:    ICD-10-CM ICD-9-CM   1. Arthritis of left knee  M17.12 716.96   2. Weakness  R53.1 780.79   3. Chronic pain of left knee  M25.562 719.46    G89.29 338.29     PSFS 4 Jogging    Subjective Evaluation    History of Present Illness  Mechanism of injury: Patient reports a 4-month history of left knee pain and instability with functional loading.  States he cannot attribute the onset to any particular activity or event.  Reports he has not noted any specific buckling in the last month.  States his pain is improved with limitation of prior home exercise program.    Subjective comment: Left Knee OA  Patient Occupation: [employer] Owner   Precautions and Work Restrictions: Decreased tolerance to require driving volumeQuality of life: excellent    Pain  Current pain ratin  At best pain ratin  At worst pain rating: 3  Location: Left knee  Quality: dull ache  Relieving factors: rest  Exacerbated by: Plyometric loading, eccentric activation.  Progression: improved    Diagnostic Tests  Abnormal x-ray: See report.    Treatments  Previous treatment: physical therapy  Patient Goals  Patient goals for therapy: increased strength, return to sport/leisure activities and return to work         Objective:    Posture: Equal weight distribution, level sacral base, mild flattening lumbar spine    Gait: Mild hip extension deficit left lower extremity with terminal stance    Functional Motion Assessment:  Single Leg Stance: Mild hip drop left lower extremity  Squat: Poor hip hinge with restricted excursion and increased trunk flexion  Lunge: Medial collapse left    Lower Extremity Manual Muscle Test:    Right: Hip  Flexion 5/5, Hip Abduction 4+/5, Hip Extension 4+/5, Knee Extension 5/5, Knee Flexion 5/5, Dorsiflexion 5/5, Plantar Flexion 5/5, Inversion 5/5, Eversion 5/5, Great Toe Extension 5/5    Left: Hip Flexion 4+/5, Hip Abduction 4+/5, Hip Extension 4+/5, Knee Extension 5/5, Knee Flexion 5/5, Dorsiflexion 5/5, Plantar Flexion 5/5, Inversion 5/5, Eversion 5/5, Great Toe Extension 5/5    Range of Motion:    Right Hip: Flexion 120, Extension 10, 90/90 Internal Rotation 20, External Rotation 55    Left Hip: Flexion 120, Extension 10, 90/90 Internal Rotation 20, External Rotation 55    Right Knee: 0-130    Left Knee: 0-120    Muscle Extensibility:    Right Hamstring 90/90: 50    Left Hamstring 90/90: 50    Palpation: Mild tenderness to palpation left knee medial joint line    Special Test:     Right: None performed    Left: Mild crepitus with patellofemoral mobility assessment, negative ligamentous exam, negative meniscal exam    Patellar Mobility:    Right: Normal    Left: Mild hypomobility      Assessment & Plan     Assessment  Other impairment: Weakness, muscular incoordination, abnormal gait    Assessment details: Patient is a 52-year-old male presenting to clinic with 4-month history of left knee pain and instability.  Signs and symptoms consistent with left knee OA resulting in catching of the left knee with functional loading and autogenic inhibition of the quadriceps during unstable moment.  Patient has been provided updated home exercise program addressing balance, proprioception, functional strengthening and reintegration into recreational activities.  Anticipate patient will do well with skilled physical therapy intervention.    Barriers to therapy: Degenerative nature left knee  Prognosis: good  Prognosis details: Pending compliance with home exercise program and formal physical therapy    Goals  Plan Goals: Primary goal: In 8 weeks, patient will display independent functional squat to facilitate improved tolerance  "to sit to stand transfers and car transfers    Short term goals:  In 4wks, pt will:  1) Display ind with HEP and verbalize compliance  2) Verbalize resolution of moments of instability  3) Display SLS 15\" without LOB  4) Display proper hip hinge   5) Display Left LE MMT: Hip Flex 4+/5, Abd 4+/5, Ext 5/5    Long Term Goals:  In 8wks, pt will:  1) Discharge ind with HEP and self management skills  2) Return to occupational demands without limitation  3) Return to recreational activities without restriction  4) Return to ADLs without modification  5) Display proper SL RDL, Functional Squat and Lunge     Plan  Therapy options: will be seen for skilled therapy services  Other planned modality interventions: Per providers discretion per clinical presentation  Planned therapy interventions: ADL retraining, body mechanics training, gait training, IADL retraining, manual therapy, neuromuscular re-education, postural training and therapeutic activities  Other planned therapy interventions: Ther Ex  Frequency: 1x week  Duration in weeks: 8  Treatment plan discussed with: patient        History # of Personal Factors and/or Comorbidities: LOW (0)  Examination of Body System(s): # of elements: LOW (1-2)  Clinical Presentation: STABLE   Clinical Decision Making: LOW       Timed:         Manual Therapy:    0     mins  55151;     Therapeutic Exercise:    15     mins  43501;     Neuromuscular Derrell:    0    mins  37048;    Therapeutic Activity:     15    mins  03964;     Gait Trainin     mins  75987;     Ultrasound:     0     mins  70326;    Ionto                               0    mins   05259  Self Care                       0     mins   44057      Un-Timed:  Electrical Stimulation:    0     mins  55684 ( );  Dry Needling     0     mins self-pay  Traction     0     mins 99677  Low Eval     30     Mins  33930  Mod Eval     0     Mins  00485  High Eval                       0     Mins  57013  Canalith Repos    0     " mins 36126      Timed Treatment:   30   mins   Total Treatment:     60   mins          PT: Sterling Phillips PT     License Number: [id number]  Electronically signed by Sterling Phillips PT, 05/03/23, 12:36 PM EDT    Certification Period: 5/3/2023 thru 7/31/2023  I certify that the therapy services are furnished while this patient is under my care.  The services outlined above are required by this patient, and will be reviewed every 90 days.         Physician Signature:__________________________________________________    PHYSICIAN: Emery Sy MD  NPI: [id number]                                      DATE:      Please sign and return via fax to [phone]. Thank you, [facility].

## 2023-06-06 ENCOUNTER — TREATMENT (OUTPATIENT)
Dept: PHYSICAL THERAPY | Facility: CLINIC | Age: 53
End: 2023-06-06
Payer: COMMERCIAL

## 2023-06-06 DIAGNOSIS — M25.562 CHRONIC PAIN OF LEFT KNEE: ICD-10-CM

## 2023-06-06 DIAGNOSIS — M17.12 ARTHRITIS OF LEFT KNEE: Primary | ICD-10-CM

## 2023-06-06 DIAGNOSIS — R53.1 WEAKNESS: ICD-10-CM

## 2023-06-06 DIAGNOSIS — G89.29 CHRONIC PAIN OF LEFT KNEE: ICD-10-CM

## 2023-06-08 NOTE — PROGRESS NOTES
Physical Therapy Daily Treatment Note  [address] [facility], [address], [facility] [address]      Patient: Sterling Campbell   : 1970  Referring practitioner: Emery Sy MD  Date of Initial Visit: Type: THERAPY  Noted: 2023  Today's Date: 2023  Patient seen for 2 sessions       Visit Diagnoses:    ICD-10-CM ICD-9-CM   1. Arthritis of left knee  M17.12 716.96   2. Weakness  R53.1 780.79   3. Chronic pain of left knee  M25.562 719.46    G89.29 338.29       Subjective:    Patient reports his knee has been doing well.  States he has been compliant with his HEP.  Reports no new complaints.  Denies any buckling sensation since last rx session.    Objective:    B Hip: Flex 120, Ext 10, IR 35, ER 60  B Knee: 0-130  Gait: WNL  B LE MMT: Hip Flex 5/5, Abd 4+/5, Ext 5/5, Quad 5/5, HS 5/5        See Exercise, Manual, and Modality Logs for complete treatment.       A/P:    Patient has responded well to skilled physical therapy intervention.  Bilateral lower extremity range of motion and strength are within normal limits at this time.  Patient displays appropriate balance and proprioceptive awareness with both stable and unstable surface exercises.  Patient is able to complete functional motion exam with a mild hip hinge deficit but mostly within functional limits.  Updated therapeutic exercise to mimic home exercise program.  Provided patient with specific instruction as it pertains to self progression and modifications as needed to maximize efficacy.  Patient verbalized understanding.  Patient to be transition to as needed status with primary home exercise based intervention.  Patient is to call with any questions or concerns.  Patient's chart to be placed on hold until 2023.  If no further contact, patient to be discharged at that time.  At this time, patient has met all goals with the exception of return to recreational activity without restriction in occupational demands due to mild limitations with  prolonged standing and with driving.    Timed:         Manual Therapy:    0     mins  21405;     Therapeutic Exercise:    40     mins  93313;     Neuromuscular Derrell:    0    mins  44759;    Therapeutic Activity:     0     mins  98193;     Gait Trainin     mins  63857;     Ultrasound:     0     mins  37916;    Ionto                               0    mins   98708  Self Care                       0     mins   16905      Un-Timed:  Electrical Stimulation:    0     mins  98377 ( );  Dry Needling     0     mins self-pay  Traction     0     mins 49742      Timed Treatment:   40   mins   Total Treatment:     40   mins    QUANG Pichardo License: [id number]

## 2024-04-29 RX ORDER — ROSUVASTATIN CALCIUM 10 MG/1
10 TABLET, COATED ORAL DAILY
Qty: 30 TABLET | Refills: 0 | Status: SHIPPED | OUTPATIENT
Start: 2024-04-29

## 2024-06-03 RX ORDER — ROSUVASTATIN CALCIUM 10 MG/1
10 TABLET, COATED ORAL DAILY
Qty: 30 TABLET | Refills: 0 | Status: SHIPPED | OUTPATIENT
Start: 2024-06-03

## 2024-07-10 RX ORDER — ROSUVASTATIN CALCIUM 10 MG/1
10 TABLET, COATED ORAL DAILY
Qty: 30 TABLET | Refills: 0 | Status: SHIPPED | OUTPATIENT
Start: 2024-07-10

## 2024-07-26 ENCOUNTER — OFFICE VISIT (OUTPATIENT)
Dept: FAMILY MEDICINE CLINIC | Facility: CLINIC | Age: 54
End: 2024-07-26
Payer: COMMERCIAL

## 2024-07-26 ENCOUNTER — LAB (OUTPATIENT)
Dept: LAB | Facility: HOSPITAL | Age: 54
End: 2024-07-26
Payer: COMMERCIAL

## 2024-07-26 VITALS
BODY MASS INDEX: 28.11 KG/M2 | SYSTOLIC BLOOD PRESSURE: 120 MMHG | OXYGEN SATURATION: 98 % | HEIGHT: 71 IN | HEART RATE: 76 BPM | WEIGHT: 200.8 LBS | DIASTOLIC BLOOD PRESSURE: 76 MMHG

## 2024-07-26 DIAGNOSIS — Z12.5 PROSTATE CANCER SCREENING: ICD-10-CM

## 2024-07-26 DIAGNOSIS — K21.9 GASTROESOPHAGEAL REFLUX DISEASE, UNSPECIFIED WHETHER ESOPHAGITIS PRESENT: ICD-10-CM

## 2024-07-26 DIAGNOSIS — E78.2 MIXED HYPERLIPIDEMIA: ICD-10-CM

## 2024-07-26 DIAGNOSIS — R05.9 COUGH, UNSPECIFIED TYPE: ICD-10-CM

## 2024-07-26 DIAGNOSIS — E78.2 MIXED HYPERLIPIDEMIA: Primary | ICD-10-CM

## 2024-07-26 DIAGNOSIS — Z51.81 MEDICATION MONITORING ENCOUNTER: ICD-10-CM

## 2024-07-26 DIAGNOSIS — M76.61 ACHILLES TENDINITIS OF RIGHT LOWER EXTREMITY: ICD-10-CM

## 2024-07-26 LAB
ALBUMIN SERPL-MCNC: 4.6 G/DL (ref 3.5–5.2)
ALBUMIN/GLOB SERPL: 1.6 G/DL
ALP SERPL-CCNC: 49 U/L (ref 39–117)
ALT SERPL W P-5'-P-CCNC: 28 U/L (ref 1–41)
ANION GAP SERPL CALCULATED.3IONS-SCNC: 11.3 MMOL/L (ref 5–15)
AST SERPL-CCNC: 20 U/L (ref 1–40)
BILIRUB SERPL-MCNC: 0.4 MG/DL (ref 0–1.2)
BUN SERPL-MCNC: 14 MG/DL (ref 6–20)
BUN/CREAT SERPL: 16.1 (ref 7–25)
CALCIUM SPEC-SCNC: 10 MG/DL (ref 8.6–10.5)
CHLORIDE SERPL-SCNC: 104 MMOL/L (ref 98–107)
CHOLEST SERPL-MCNC: 184 MG/DL (ref 0–200)
CO2 SERPL-SCNC: 25.7 MMOL/L (ref 22–29)
CREAT SERPL-MCNC: 0.87 MG/DL (ref 0.76–1.27)
DEPRECATED RDW RBC AUTO: 39.6 FL (ref 37–54)
EGFRCR SERPLBLD CKD-EPI 2021: 101.9 ML/MIN/1.73
ERYTHROCYTE [DISTWIDTH] IN BLOOD BY AUTOMATED COUNT: 12.2 % (ref 12.3–15.4)
GLOBULIN UR ELPH-MCNC: 2.9 GM/DL
GLUCOSE SERPL-MCNC: 87 MG/DL (ref 65–99)
HCT VFR BLD AUTO: 41.8 % (ref 37.5–51)
HDLC SERPL-MCNC: 50 MG/DL (ref 40–60)
HGB BLD-MCNC: 14.5 G/DL (ref 13–17.7)
LDLC SERPL CALC-MCNC: 100 MG/DL (ref 0–100)
LDLC/HDLC SERPL: 1.88 {RATIO}
MCH RBC QN AUTO: 30.7 PG (ref 26.6–33)
MCHC RBC AUTO-ENTMCNC: 34.7 G/DL (ref 31.5–35.7)
MCV RBC AUTO: 88.4 FL (ref 79–97)
PLATELET # BLD AUTO: 244 10*3/MM3 (ref 140–450)
PMV BLD AUTO: 9.1 FL (ref 6–12)
POTASSIUM SERPL-SCNC: 4.4 MMOL/L (ref 3.5–5.2)
PROT SERPL-MCNC: 7.5 G/DL (ref 6–8.5)
PSA SERPL-MCNC: 0.39 NG/ML (ref 0–4)
RBC # BLD AUTO: 4.73 10*6/MM3 (ref 4.14–5.8)
SODIUM SERPL-SCNC: 141 MMOL/L (ref 136–145)
T4 FREE SERPL-MCNC: 1.59 NG/DL (ref 0.92–1.68)
TRIGL SERPL-MCNC: 201 MG/DL (ref 0–150)
TSH SERPL DL<=0.05 MIU/L-ACNC: 1.3 UIU/ML (ref 0.27–4.2)
VLDLC SERPL-MCNC: 34 MG/DL (ref 5–40)
WBC NRBC COR # BLD AUTO: 4.54 10*3/MM3 (ref 3.4–10.8)

## 2024-07-26 PROCEDURE — 80050 GENERAL HEALTH PANEL: CPT

## 2024-07-26 PROCEDURE — 80061 LIPID PANEL: CPT

## 2024-07-26 PROCEDURE — 99214 OFFICE O/P EST MOD 30 MIN: CPT | Performed by: FAMILY MEDICINE

## 2024-07-26 PROCEDURE — 84439 ASSAY OF FREE THYROXINE: CPT

## 2024-07-26 PROCEDURE — 36415 COLL VENOUS BLD VENIPUNCTURE: CPT

## 2024-07-26 PROCEDURE — G0103 PSA SCREENING: HCPCS

## 2024-07-26 RX ORDER — ROSUVASTATIN CALCIUM 10 MG/1
10 TABLET, COATED ORAL DAILY
Qty: 90 TABLET | Refills: 3 | Status: SHIPPED | OUTPATIENT
Start: 2024-07-26

## 2024-07-26 RX ORDER — OMEPRAZOLE 40 MG/1
40 CAPSULE, DELAYED RELEASE ORAL DAILY
Qty: 30 CAPSULE | Refills: 2 | Status: SHIPPED | OUTPATIENT
Start: 2024-07-26

## 2024-07-27 NOTE — PROGRESS NOTES
Subjective   Sterling Campbell is a 54 y.o. male    Chief Complaint    Hyperlipidemia  Healthcare maintenance  Cough  Acid reflux  Right heel pain    HPI  History of Present Illness  The patient is a 53-year-old male who is complaining today of a cough and is due for medication refills and lab work.    He has been experiencing a cough since 03/2023, which he suspects may be related to anxiety. The cough is currently manageable, but he noticed it yesterday while working. He also experiences occasional esophageal blockage if he eats too quickly, requiring him to relax for 5 minutes to alleviate the blockage. He does not believe he has sinus drainage, attributing it to his lifelong allergies. His breathing is normal, without shortness of breath, and he has not taken any medication for acid reflux.    He typically stretches his calves, but recently took a 2-week break, he has been experiencing a sharp pain where his right Achilles is attached. The Achilles is not painful, and he can walk without discomfort. However, stretching triggers a pulling sensation, which he rates as 5 out of 10.    He has arthritis and no cartilage in his knees. He takes glucosamine. He does not have any pain. Dr. Sy told him that he does not have any cartilage underneath his kneecaps. He has stopped running and basketball.    He is due for healthcare maintenance labs and prostate cancer screening labs      The following portions of the patient's history were reviewed and updated as appropriate: allergies, current medications, past social history and problem list    Review of Systems   Constitutional:  Negative for appetite change, chills, diaphoresis, fatigue, fever and unexpected weight change.   HENT:  Negative for postnasal drip and sinus pressure.    Respiratory:  Positive for cough. Negative for choking, chest tightness, shortness of breath and wheezing.    Cardiovascular:  Negative for chest pain, palpitations and leg swelling.    Gastrointestinal:  Negative for abdominal distention, abdominal pain, diarrhea, nausea and vomiting.        Patient experiencing heartburn/acid reflux     Endocrine: Negative for polydipsia, polyphagia and polyuria.   Genitourinary:  Negative for dysuria, frequency and urgency.   Musculoskeletal:  Positive for myalgias. Negative for arthralgias and back pain.   Skin:  Negative for color change and rash.   Neurological:  Negative for weakness and headaches.   Hematological:  Negative for adenopathy. Does not bruise/bleed easily.     Objective     Vitals:    07/26/24 0803   BP: 120/76   Pulse: 76   SpO2: 98%       Physical Exam  Vitals and nursing note reviewed.   Constitutional:       General: He is not in acute distress.     Appearance: Normal appearance. He is well-developed. He is not ill-appearing, toxic-appearing or diaphoretic.   HENT:      Head: Normocephalic and atraumatic.   Eyes:      General: No scleral icterus.     Conjunctiva/sclera: Conjunctivae normal.      Pupils: Pupils are equal, round, and reactive to light.   Neck:      Thyroid: No thyromegaly.   Cardiovascular:      Rate and Rhythm: Normal rate and regular rhythm.      Heart sounds: Normal heart sounds.   Pulmonary:      Effort: Pulmonary effort is normal. No respiratory distress.      Breath sounds: Normal breath sounds. No wheezing or rales.   Abdominal:      General: Bowel sounds are normal. There is no distension.      Palpations: Abdomen is soft. There is no mass.      Tenderness: There is no abdominal tenderness. There is no guarding or rebound.      Hernia: No hernia is present.   Musculoskeletal:      Cervical back: Neck supple.      Right ankle:      Right Achilles Tendon: Tenderness present.   Lymphadenopathy:      Cervical: No cervical adenopathy.   Skin:     General: Skin is warm and dry.      Coloration: Skin is not jaundiced or pale.      Findings: No rash.   Neurological:      Mental Status: He is alert and oriented to person,  place, and time.   Psychiatric:         Behavior: Behavior normal.   Physical Exam      Assessment & Plan   Assessment & Plan  1. cough  The cough and wheezing he's experiencing is likely due to acid reflux. Omeprazole has been prescribed for a duration of 6 to 8 weeks. Should the omeprazole prove ineffective, a referral to a pulmonologist will be considered.    2. Medication refill.  His blood pressure readings are within normal range. Blood work has been ordered. His rosuvastatin prescription has been refilled.    3. Achilles tendinitis.  Inflammation is evident. He has been advised to continue stretching, apply ice, and consider physical therapy.    Problems Addressed this Visit    None  Visit Diagnoses       Mixed hyperlipidemia    -  Primary    Relevant Medications    rosuvastatin (CRESTOR) 10 MG tablet    Other Relevant Orders    Comprehensive Metabolic Panel (Completed)    Lipid Panel (Completed)    Prostate cancer screening        Relevant Orders    PSA Screen (Completed)    Cough, unspecified type        Relevant Orders    CBC (No Diff) (Completed)    Comprehensive Metabolic Panel (Completed)    TSH (Completed)    T4, Free (Completed)    Gastroesophageal reflux disease, unspecified whether esophagitis present        Relevant Medications    omeprazole (priLOSEC) 40 MG capsule    Other Relevant Orders    Comprehensive Metabolic Panel (Completed)    Achilles tendinitis of right lower extremity        Medication monitoring encounter        Relevant Orders    CBC (No Diff) (Completed)    Comprehensive Metabolic Panel (Completed)          Diagnoses         Codes Comments    Mixed hyperlipidemia    -  Primary ICD-10-CM: E78.2  ICD-9-CM: 272.2     Prostate cancer screening     ICD-10-CM: Z12.5  ICD-9-CM: V76.44     Cough, unspecified type     ICD-10-CM: R05.9  ICD-9-CM: 786.2     Gastroesophageal reflux disease, unspecified whether esophagitis present     ICD-10-CM: K21.9  ICD-9-CM: 530.81     Achilles tendinitis  of right lower extremity     ICD-10-CM: M76.61  ICD-9-CM: 726.71     Medication monitoring encounter     ICD-10-CM: Z51.81  ICD-9-CM: V58.83           I spent 35 minutes in patient care: Reviewing records prior to the visit, examining the patient, entering orders and documentation    Part of this note may be an electronic transcription/translation of spoken language to printed text using the Dragon Dictation System.

## 2024-08-20 ENCOUNTER — TELEPHONE (OUTPATIENT)
Dept: FAMILY MEDICINE CLINIC | Facility: CLINIC | Age: 54
End: 2024-08-20

## 2024-08-20 ENCOUNTER — TREATMENT (OUTPATIENT)
Dept: PHYSICAL THERAPY | Facility: CLINIC | Age: 54
End: 2024-08-20
Payer: COMMERCIAL

## 2024-08-20 DIAGNOSIS — M79.671 PAIN OF RIGHT HEEL: Primary | ICD-10-CM

## 2024-08-20 DIAGNOSIS — L57.0 ACTINIC KERATOSIS OF LEFT CHEEK: Primary | ICD-10-CM

## 2024-08-20 NOTE — PROGRESS NOTES
Physical Therapy Initial Evaluation and Plan of Care    [facility]  [address]  [place] [address]    Patient: Sterling Campbell   : 1970  Diagnosis/ICD-10 Code:  Pain of right heel [M79.671]  Referring practitioner: Harish Costello*  Date of Initial Visit: 2024  Today's Date: 2024  Patient seen for 1 sessions           Subjective Questionnaire: LEFS: 80/80      Subjective Evaluation    History of Present Illness  Mechanism of injury: R heel pain when stretching his calves at the gym.            Objective          Tenderness     Right Ankle/Foot   Tenderness in the Achilles insertion.     Active Range of Motion   Left Ankle/Foot   Dorsiflexion (ke): WFL  Plantar flexion: WFL  Inversion: WFL  Eversion: WFL    Right Ankle/Foot   Dorsiflexion (ke): WFL  Plantar flexion: WFL  Inversion: WFL  Eversion: WFL    Additional Active Range of Motion Details  Right hallux rigidus with reduced left great toe extension    Passive Range of Motion   Left Ankle/Foot    Dorsiflexion (ke): WFL    Right Ankle/Foot    Dorsiflexion (ke): WFL    Additional Passive Range of Motion Details  Right hallux rigidus with reduced left great toe extension    Joint Play   Left Ankle/Foot  Joints within functional limits are the talocrural joint, subtalar joint, midfoot and forefoot.     Right Ankle/Foot  Joints within functional limits are the talocrural joint, subtalar joint, midfoot and forefoot.     Strength/Myotome Testing     Left Ankle/Foot   Dorsiflexion: 5  Plantar flexion: 5  Inversion: 5  Eversion: 5    Right Ankle/Foot   Dorsiflexion: 5  Plantar flexion: 5  Inversion: 5  Eversion: 5          Assessment & Plan       Assessment  Impairments: abnormal or restricted ROM, activity intolerance and pain with function   Assessment details: Patient is a 54-year-old male who presents with right heel pain while stretching prior to exercise.  Full strength and range of motion noted at  ankles.  Palpation of right Achilles insertion into the calcaneus reproduces his main complaint of pain.  With patient only reporting pain with stretching, eccentric loading was trialed.  He reported increased pain with repetition of full weightbearing eccentric loading, with subsided.  Provided patient with introductory eccentric loading HEP with parameters for progression and regression depending on symptom response.  He will return if needed to PT.  Barriers to therapy: Comorbidities  Prognosis: good    Goals  Plan Goals: LONG TERM GOALS:   8 weeks  1.  Patient to perform 10 single-leg heel raises without increased right heel pain.  2.  Patient to resume normal stretching of Achilles tendons without increased right heel pain.      Plan  Therapy options: will be seen for skilled therapy services  Planned modality interventions: dry needling, electrical stimulation/Russian stimulation, cryotherapy, TENS, thermotherapy (hydrocollator packs) and ultrasound  Planned therapy interventions: manual therapy, neuromuscular re-education, motor coordination training, soft tissue mobilization, strengthening, joint mobilization, therapeutic activities, stretching, home exercise program, gait training, functional ROM exercises, balance/weight-bearing training, fine motor coordination training, postural training, spinal/joint mobilization, abdominal trunk stabilization, ADL retraining and flexibility  Frequency: 2x month  Duration in weeks: 8  Treatment plan discussed with: patient        Timed:  Manual Therapy:    0     mins  45199;  Therapeutic Exercise:    10     mins  05772;     Neuromuscular Derrell:    0    mins  71874;    Therapeutic Activity:     15     mins  50743;     Gait Trainin     mins  32637;     Ultrasound:     0     mins  61087;    Electrical Stimulation:    0     mins  66057 ( );    Untimed:  Electrical Stimulation:    0     mins  15869 ( );  Mechanical Traction:    0     mins  03891;   Dry  Needlin     mins      Timed Treatment:   25   mins   Total Treatment:     40   mins    PT SIGNATURE: En QUANG Alberts   License Number: [id number]  DATE TREATMENT INITIATED: 2024    Initial Certification  Certification Period: 2024  I certify that the therapy services are furnished while this patient is under my care.  The services outlined above are required by this patient, and will be reviewed every 90 days.     PHYSICIAN: Harish Cook MD      DATE:     Please sign and return via fax to [phone].. Thank you, [facility].

## 2024-10-28 RX ORDER — OMEPRAZOLE 40 MG/1
40 CAPSULE, DELAYED RELEASE ORAL DAILY
Qty: 30 CAPSULE | Refills: 2 | Status: SHIPPED | OUTPATIENT
Start: 2024-10-28

## 2025-02-10 RX ORDER — OMEPRAZOLE 40 MG/1
40 CAPSULE, DELAYED RELEASE ORAL DAILY
Qty: 30 CAPSULE | Refills: 2 | Status: SHIPPED | OUTPATIENT
Start: 2025-02-10

## 2025-05-15 RX ORDER — OMEPRAZOLE 40 MG/1
40 CAPSULE, DELAYED RELEASE ORAL DAILY
Qty: 30 CAPSULE | Refills: 2 | Status: SHIPPED | OUTPATIENT
Start: 2025-05-15

## 2025-08-18 DIAGNOSIS — G47.33 OSA (OBSTRUCTIVE SLEEP APNEA): Primary | ICD-10-CM

## 2025-08-18 DIAGNOSIS — G47.34 NOCTURNAL HYPOXIA: ICD-10-CM

## 2025-08-25 RX ORDER — OMEPRAZOLE 40 MG/1
40 CAPSULE, DELAYED RELEASE ORAL DAILY
Qty: 30 CAPSULE | Refills: 2 | Status: SHIPPED | OUTPATIENT
Start: 2025-08-25

## 2025-08-25 RX ORDER — ROSUVASTATIN CALCIUM 10 MG/1
TABLET, COATED ORAL
Qty: 90 TABLET | Refills: 0 | Status: SHIPPED | OUTPATIENT
Start: 2025-08-25